# Patient Record
Sex: MALE | Race: WHITE
[De-identification: names, ages, dates, MRNs, and addresses within clinical notes are randomized per-mention and may not be internally consistent; named-entity substitution may affect disease eponyms.]

---

## 2018-02-10 ENCOUNTER — HOSPITAL ENCOUNTER (EMERGENCY)
Dept: HOSPITAL 47 - EC | Age: 83
Discharge: ANOTHER HOSPITAL | End: 2018-02-10
Payer: MEDICARE

## 2018-02-10 VITALS — TEMPERATURE: 97.3 F

## 2018-02-10 VITALS — HEART RATE: 76 BPM | DIASTOLIC BLOOD PRESSURE: 77 MMHG | SYSTOLIC BLOOD PRESSURE: 134 MMHG

## 2018-02-10 VITALS — RESPIRATION RATE: 18 BRPM

## 2018-02-10 DIAGNOSIS — R53.83: ICD-10-CM

## 2018-02-10 DIAGNOSIS — I10: ICD-10-CM

## 2018-02-10 DIAGNOSIS — I16.0: ICD-10-CM

## 2018-02-10 DIAGNOSIS — I63.9: Primary | ICD-10-CM

## 2018-02-10 DIAGNOSIS — Z87.891: ICD-10-CM

## 2018-02-10 DIAGNOSIS — Z79.899: ICD-10-CM

## 2018-02-10 DIAGNOSIS — G81.94: ICD-10-CM

## 2018-02-10 LAB
ALBUMIN SERPL-MCNC: 4 G/DL (ref 3.5–5)
ALP SERPL-CCNC: 98 U/L (ref 38–126)
ALT SERPL-CCNC: 36 U/L (ref 21–72)
ANION GAP SERPL CALC-SCNC: 9 MMOL/L
APTT BLD: 26.8 SEC (ref 22–30)
AST SERPL-CCNC: 31 U/L (ref 17–59)
BASOPHILS # BLD AUTO: 0 K/UL (ref 0–0.2)
BASOPHILS NFR BLD AUTO: 1 %
BUN SERPL-SCNC: 15 MG/DL (ref 9–20)
CALCIUM SPEC-MCNC: 9.5 MG/DL (ref 8.4–10.2)
CHLORIDE SERPL-SCNC: 104 MMOL/L (ref 98–107)
CK SERPL-CCNC: 119 U/L (ref 55–170)
CO2 SERPL-SCNC: 27 MMOL/L (ref 22–30)
EOSINOPHIL # BLD AUTO: 0.1 K/UL (ref 0–0.7)
EOSINOPHIL NFR BLD AUTO: 2 %
ERYTHROCYTE [DISTWIDTH] IN BLOOD BY AUTOMATED COUNT: 4.2 M/UL (ref 4.3–5.9)
ERYTHROCYTE [DISTWIDTH] IN BLOOD: 14.3 % (ref 11.5–15.5)
GLUCOSE BLD-MCNC: 66 MG/DL (ref 75–99)
GLUCOSE SERPL-MCNC: 96 MG/DL (ref 74–99)
HCT VFR BLD AUTO: 44.1 % (ref 39–53)
HGB BLD-MCNC: 14.5 GM/DL (ref 13–17.5)
INR PPP: 1.1 (ref ?–1.2)
LYMPHOCYTES # SPEC AUTO: 1.5 K/UL (ref 1–4.8)
LYMPHOCYTES NFR SPEC AUTO: 30 %
MCH RBC QN AUTO: 34.6 PG (ref 25–35)
MCHC RBC AUTO-ENTMCNC: 32.9 G/DL (ref 31–37)
MCV RBC AUTO: 105.1 FL (ref 80–100)
MONOCYTES # BLD AUTO: 0.3 K/UL (ref 0–1)
MONOCYTES NFR BLD AUTO: 6 %
NEUTROPHILS # BLD AUTO: 3 K/UL (ref 1.3–7.7)
NEUTROPHILS NFR BLD AUTO: 59 %
PLATELET # BLD AUTO: 153 K/UL (ref 150–450)
POTASSIUM SERPL-SCNC: 3.9 MMOL/L (ref 3.5–5.1)
PROT SERPL-MCNC: 6.7 G/DL (ref 6.3–8.2)
PT BLD: 10.5 SEC (ref 9–12)
SODIUM SERPL-SCNC: 140 MMOL/L (ref 137–145)
TROPONIN I SERPL-MCNC: <0.012 NG/ML (ref 0–0.03)
WBC # BLD AUTO: 5.1 K/UL (ref 3.8–10.6)

## 2018-02-10 PROCEDURE — 82553 CREATINE MB FRACTION: CPT

## 2018-02-10 PROCEDURE — 71045 X-RAY EXAM CHEST 1 VIEW: CPT

## 2018-02-10 PROCEDURE — 85610 PROTHROMBIN TIME: CPT

## 2018-02-10 PROCEDURE — 96375 TX/PRO/DX INJ NEW DRUG ADDON: CPT

## 2018-02-10 PROCEDURE — 99291 CRITICAL CARE FIRST HOUR: CPT

## 2018-02-10 PROCEDURE — 80053 COMPREHEN METABOLIC PANEL: CPT

## 2018-02-10 PROCEDURE — 84484 ASSAY OF TROPONIN QUANT: CPT

## 2018-02-10 PROCEDURE — 96365 THER/PROPH/DIAG IV INF INIT: CPT

## 2018-02-10 PROCEDURE — 93005 ELECTROCARDIOGRAM TRACING: CPT

## 2018-02-10 PROCEDURE — 85730 THROMBOPLASTIN TIME PARTIAL: CPT

## 2018-02-10 PROCEDURE — 85025 COMPLETE CBC W/AUTO DIFF WBC: CPT

## 2018-02-10 PROCEDURE — 70450 CT HEAD/BRAIN W/O DYE: CPT

## 2018-02-10 PROCEDURE — 36415 COLL VENOUS BLD VENIPUNCTURE: CPT

## 2018-02-10 PROCEDURE — 82550 ASSAY OF CK (CPK): CPT

## 2018-02-10 NOTE — ED
Medical Decision Making





- Lab Data


Result diagrams: 


 02/10/18 13:56





 02/10/18 13:56





 Lab Results











  02/10/18 02/10/18 02/10/18 Range/Units





  13:45 13:56 13:56 


 


WBC   5.1   (3.8-10.6)  k/uL


 


RBC   4.20 L   (4.30-5.90)  m/uL


 


Hgb   14.5   (13.0-17.5)  gm/dL


 


Hct   44.1   (39.0-53.0)  %


 


MCV   105.1 H   (80.0-100.0)  fL


 


MCH   34.6   (25.0-35.0)  pg


 


MCHC   32.9   (31.0-37.0)  g/dL


 


RDW   14.3   (11.5-15.5)  %


 


Plt Count   153   (150-450)  k/uL


 


Neutrophils %   59   %


 


Lymphocytes %   30   %


 


Monocytes %   6   %


 


Eosinophils %   2   %


 


Basophils %   1   %


 


Neutrophils #   3.0   (1.3-7.7)  k/uL


 


Lymphocytes #   1.5   (1.0-4.8)  k/uL


 


Monocytes #   0.3   (0-1.0)  k/uL


 


Eosinophils #   0.1   (0-0.7)  k/uL


 


Basophils #   0.0   (0-0.2)  k/uL


 


Macrocytosis   Moderate   


 


PT     (9.0-12.0)  sec


 


INR     (<1.2)  


 


APTT     (22.0-30.0)  sec


 


Sodium    140  (137-145)  mmol/L


 


Potassium    3.9  (3.5-5.1)  mmol/L


 


Chloride    104  ()  mmol/L


 


Carbon Dioxide    27  (22-30)  mmol/L


 


Anion Gap    9  mmol/L


 


BUN    15  (9-20)  mg/dL


 


Creatinine    1.01  (0.66-1.25)  mg/dL


 


Est GFR (MDRD) Af Amer    >60  (>60 ml/min/1.73 sqM)  


 


Est GFR (MDRD) Non-Af    >60  (>60 ml/min/1.73 sqM)  


 


Glucose    96  (74-99)  mg/dL


 


POC Glucose (mg/dL)  66 L    (75-99)  mg/dL


 


POC Glu Operater ID  Loan Morrison    


 


Calcium    9.5  (8.4-10.2)  mg/dL


 


Total Bilirubin    1.1  (0.2-1.3)  mg/dL


 


AST    31  (17-59)  U/L


 


ALT    36  (21-72)  U/L


 


Alkaline Phosphatase    98  ()  U/L


 


Total Protein    6.7  (6.3-8.2)  g/dL


 


Albumin    4.0  (3.5-5.0)  g/dL














  02/10/18 Range/Units





  13:56 


 


WBC   (3.8-10.6)  k/uL


 


RBC   (4.30-5.90)  m/uL


 


Hgb   (13.0-17.5)  gm/dL


 


Hct   (39.0-53.0)  %


 


MCV   (80.0-100.0)  fL


 


MCH   (25.0-35.0)  pg


 


MCHC   (31.0-37.0)  g/dL


 


RDW   (11.5-15.5)  %


 


Plt Count   (150-450)  k/uL


 


Neutrophils %   %


 


Lymphocytes %   %


 


Monocytes %   %


 


Eosinophils %   %


 


Basophils %   %


 


Neutrophils #   (1.3-7.7)  k/uL


 


Lymphocytes #   (1.0-4.8)  k/uL


 


Monocytes #   (0-1.0)  k/uL


 


Eosinophils #   (0-0.7)  k/uL


 


Basophils #   (0-0.2)  k/uL


 


Macrocytosis   


 


PT  10.5  (9.0-12.0)  sec


 


INR  1.1  (<1.2)  


 


APTT  26.8  (22.0-30.0)  sec


 


Sodium   (137-145)  mmol/L


 


Potassium   (3.5-5.1)  mmol/L


 


Chloride   ()  mmol/L


 


Carbon Dioxide   (22-30)  mmol/L


 


Anion Gap   mmol/L


 


BUN   (9-20)  mg/dL


 


Creatinine   (0.66-1.25)  mg/dL


 


Est GFR (MDRD) Af Amer   (>60 ml/min/1.73 sqM)  


 


Est GFR (MDRD) Non-Af   (>60 ml/min/1.73 sqM)  


 


Glucose   (74-99)  mg/dL


 


POC Glucose (mg/dL)   (75-99)  mg/dL


 


POC Glu Operater ID   


 


Calcium   (8.4-10.2)  mg/dL


 


Total Bilirubin   (0.2-1.3)  mg/dL


 


AST   (17-59)  U/L


 


ALT   (21-72)  U/L


 


Alkaline Phosphatase   ()  U/L


 


Total Protein   (6.3-8.2)  g/dL


 


Albumin   (3.5-5.0)  g/dL














Disposition


Clinical Impression: 


 Cerebrovascular accident, Hypertensive emergency





Disposition: OTHER INSTITUTION NOT DEFINED


Condition: Serious


Referrals: 


Joel Mcadams MD [Primary Care Provider] - 1-2 days





- Out of Hospital Transfer - Req. Specs


Out of Hospital Transfer - Requested Specifics: Neurological ICU

## 2018-02-10 NOTE — ED
Neuro HPI





- General


Stated Complaint: CVA


Time Seen by Provider: 02/10/18 13:32


Source: EMS, RN notes reviewed


Mode of arrival: EMS





- History of Present Illness


Is the patient presenting with stroke symptoms?: Yes


Initial Comments: 





This is a 84-year-old male history of hypertension was last seen around 11:30 

today he was found on the floor by his wife and his bedroom.  EMS was called he 

was found have a right lateral gaze left hemiparesis.  He was nonverbal and did 

not respond to verbal commands he does respond to painful stimulus.  Injury 

reported.





- Related Data


Home Medications: 


 Home Medications











 Medication  Instructions  Recorded  Confirmed


 


DULoxetine HCL [Cymbalta] 30 mg PO DAILY 02/10/18 02/10/18


 


Methotrexate Sodium [Methotrexate] 12.5 mg PO DUFF 02/10/18 02/10/18


 


Methylphenidate HCl 10 mg PO BID 02/10/18 02/10/18


 


Triamterene-Hctz 37.5-25Mg 1 cap PO WE 02/10/18 02/10/18





[Dyazide 37.5-25 Capsule]   











Allergies/Adverse Reactions: 


 Allergies











Allergy/AdvReac Type Severity Reaction Status Date / Time


 


No Known Allergies Allergy   Verified 02/10/18 14:04














Review of Systems


ROS Statement: 


Those systems with pertinent positive or pertinent negative responses have been 

documented in the HPI.





ROS Other: All systems not noted in ROS Statement are negative.





General Exam





- General Exam Comments


Initial Comments: 





This is a well-developed well-nourished awake lethargic male


Limitations: altered mental status, physical limitation


General appearance: lethargic, other


Head exam: Present: atraumatic, normocephalic, normal inspection


Eye exam: Present: other (Right lateral gaze pupils are equal round and 

reactive.)


ENT exam: Present: mucous membranes dry


Neck exam: Present: normal inspection.  Absent: tenderness, meningismus, 

lymphadenopathy


Respiratory exam: Present: normal lung sounds bilaterally.  Absent: respiratory 

distress, wheezes, rales, rhonchi, stridor


Cardiovascular Exam: Present: regular rate, normal rhythm, normal heart sounds.

  Absent: systolic murmur, diastolic murmur, rubs, gallop, clicks


GI/Abdominal exam: Present: soft, normal bowel sounds.  Absent: distended, 

tenderness, guarding, rebound, rigid


Rectal exam: Present: deferred


Extremities exam: Present: normal inspection, normal capillary refill, other (

Evidence of left jaclyn-plegia and left hemiparesis of lower extremity).  Absent: 

full ROM


Back exam: Present: normal inspection


Neurological exam: Present: altered


Psychiatric exam: Present: other (Unable to evaluate)


Skin exam: Present: warm, dry, intact, normal color





Stroke MDM





- Lab Data


Result diagrams: 


 02/10/18 13:56





 02/10/18 13:56


 Lab Results











  02/10/18 02/10/18 02/10/18 Range/Units





  13:45 13:56 13:56 


 


WBC   5.1   (3.8-10.6)  k/uL


 


RBC   4.20 L   (4.30-5.90)  m/uL


 


Hgb   14.5   (13.0-17.5)  gm/dL


 


Hct   44.1   (39.0-53.0)  %


 


MCV   105.1 H   (80.0-100.0)  fL


 


MCH   34.6   (25.0-35.0)  pg


 


MCHC   32.9   (31.0-37.0)  g/dL


 


RDW   14.3   (11.5-15.5)  %


 


Plt Count   153   (150-450)  k/uL


 


Neutrophils %   59   %


 


Lymphocytes %   30   %


 


Monocytes %   6   %


 


Eosinophils %   2   %


 


Basophils %   1   %


 


Neutrophils #   3.0   (1.3-7.7)  k/uL


 


Lymphocytes #   1.5   (1.0-4.8)  k/uL


 


Monocytes #   0.3   (0-1.0)  k/uL


 


Eosinophils #   0.1   (0-0.7)  k/uL


 


Basophils #   0.0   (0-0.2)  k/uL


 


Macrocytosis   Moderate   


 


PT     (9.0-12.0)  sec


 


INR     (<1.2)  


 


APTT     (22.0-30.0)  sec


 


Sodium    140  (137-145)  mmol/L


 


Potassium    3.9  (3.5-5.1)  mmol/L


 


Chloride    104  ()  mmol/L


 


Carbon Dioxide    27  (22-30)  mmol/L


 


Anion Gap    9  mmol/L


 


BUN    15  (9-20)  mg/dL


 


Creatinine    1.01  (0.66-1.25)  mg/dL


 


Est GFR (MDRD) Af Amer    >60  (>60 ml/min/1.73 sqM)  


 


Est GFR (MDRD) Non-Af    >60  (>60 ml/min/1.73 sqM)  


 


Glucose    96  (74-99)  mg/dL


 


POC Glucose (mg/dL)  66 L    (75-99)  mg/dL


 


POC Glu Operater ID  Loan Morrison    


 


Calcium    9.5  (8.4-10.2)  mg/dL


 


Total Bilirubin    1.1  (0.2-1.3)  mg/dL


 


AST    31  (17-59)  U/L


 


ALT    36  (21-72)  U/L


 


Alkaline Phosphatase    98  ()  U/L


 


Total Protein    6.7  (6.3-8.2)  g/dL


 


Albumin    4.0  (3.5-5.0)  g/dL














  02/10/18 Range/Units





  13:56 


 


WBC   (3.8-10.6)  k/uL


 


RBC   (4.30-5.90)  m/uL


 


Hgb   (13.0-17.5)  gm/dL


 


Hct   (39.0-53.0)  %


 


MCV   (80.0-100.0)  fL


 


MCH   (25.0-35.0)  pg


 


MCHC   (31.0-37.0)  g/dL


 


RDW   (11.5-15.5)  %


 


Plt Count   (150-450)  k/uL


 


Neutrophils %   %


 


Lymphocytes %   %


 


Monocytes %   %


 


Eosinophils %   %


 


Basophils %   %


 


Neutrophils #   (1.3-7.7)  k/uL


 


Lymphocytes #   (1.0-4.8)  k/uL


 


Monocytes #   (0-1.0)  k/uL


 


Eosinophils #   (0-0.7)  k/uL


 


Basophils #   (0-0.2)  k/uL


 


Macrocytosis   


 


PT  10.5  (9.0-12.0)  sec


 


INR  1.1  (<1.2)  


 


APTT  26.8  (22.0-30.0)  sec


 


Sodium   (137-145)  mmol/L


 


Potassium   (3.5-5.1)  mmol/L


 


Chloride   ()  mmol/L


 


Carbon Dioxide   (22-30)  mmol/L


 


Anion Gap   mmol/L


 


BUN   (9-20)  mg/dL


 


Creatinine   (0.66-1.25)  mg/dL


 


Est GFR (MDRD) Af Amer   (>60 ml/min/1.73 sqM)  


 


Est GFR (MDRD) Non-Af   (>60 ml/min/1.73 sqM)  


 


Glucose   (74-99)  mg/dL


 


POC Glucose (mg/dL)   (75-99)  mg/dL


 


POC Glu Operater ID   


 


Calcium   (8.4-10.2)  mg/dL


 


Total Bilirubin   (0.2-1.3)  mg/dL


 


AST   (17-59)  U/L


 


ALT   (21-72)  U/L


 


Alkaline Phosphatase   ()  U/L


 


Total Protein   (6.3-8.2)  g/dL


 


Albumin   (3.5-5.0)  g/dL














- NIH Stroke Scale


1a. Level of Consciousness: (1) not alert, arousable


1b. LOC Questions: (2) answers no questions correctly


1c. LOC Commands: (2) performs no tasks correctly


2. Best Gaze: (2) forced deviation


3. Visual: (0) no visual loss


4. Facial Palsy: (1) minor paralysis


5a. Motor Arm Left: (3) no gravity effort


5b. Motor Arm Right: (1) drift


6a. Motor Leg Left: (2) some gravity effort


6b. Motor Leg Right: (1) drift


7. Limb Ataxia: (0) absent


8. Sensory: (0) normal


9. Best Language: (un) mute/global aphasia


10. Dysarthria: (0) normal


11. Extinction/Inattention: (2) profound inattention





- Thrombolytic Inclusion/Exclusion


Thrombolytic Contraindications: Hx of ICH/AVM/Aneurysms





- Radiology Data


Radiology results: report reviewed (Patient does have an intraparenchymal bleed 

on the right hemisphere.  I did discuss the case with Dr. Fang), image reviewed





- EKG Data


-: EKG Interpreted by Me


EKG shows normal: sinus rhythm (Sinus rhythm 64.  Interval 202 QRS 88 daily 

since QTC of 400/412 artifact is present)





Past Medical History


Past Medical History: Diabetes Mellitus, Hypertension


Additional Past Medical History / Comment(s): PER PTS WIFE-PT WAS DX APPROX A 

MONTH AGO WITH DM.


History of Any Multi-Drug Resistant Organisms: None Reported


Past Surgical History: Tonsillectomy


Past Anesthesia/Blood Transfusion Reactions: No Reported Reaction


Past Psychological History: No Psychological Hx Reported


Smoking Status: Former smoker


Past Alcohol Use History: None Reported


Past Drug Use History: None Reported





- Past Family History


  ** Father


Family Medical History: Dementia





  ** Mother


Family Medical History: Cancer


Additional Family Medical History / Comment(s): OVARIAN CANCER





  ** Sister(s)


Family Medical History: Cancer


Additional Family Medical History / Comment(s): ESOPHAGEAL CANER





Course


 Vital Signs











  02/10/18 02/10/18 02/10/18





  13:35 13:50 14:05


 


Temperature 97.3 F L  


 


Pulse Rate 70 70 62


 


Respiratory 18 18 16





Rate   


 


Blood Pressure 207/99 180/92 174/90


 


O2 Sat by Pulse 95 97 99





Oximetry   














- Reevaluation(s)


Reevaluation #1: 





02/10/18 14:38


Patient did have slight movement of his left upper extremity.





Critical Care Time


Critical Care Time: Yes


Critical Care Time: 





45 minutes of critical care time which includes monitoring initially EMS run 

and discussed with paramedics history physical labs x-rays discussion with the 

interventional is discussion with radiology.  Discussion with family members.  

Several re-evaluations the patient.  Discussion with the emergency department 

at [facility].





Disposition


Clinical Impression: 


 Cerebrovascular accident





Disposition: OTHER INSTITUTION NOT DEFINED


Condition: Serious


Referrals: 


Joel Mcadams MD [Primary Care Provider] - 1-2 days





- Out of Hospital Transfer - Req. Specs


Out of Hospital Transfer - Requested Specifics: Neurological ICU

## 2018-02-10 NOTE — CT
EXAMINATION TYPE: CT brain wo con for TPA

 

DATE OF EXAM: 2/10/2018

 

COMPARISON: Previous study dated 8/23/2016.

 

HISTORY: Lt side weakness

 

CT DLP: 1064.3 mGycm

Automated exposure control for dose reduction was used.

 

FINDINGS: 

There is a 4.3 x 4.8 x 3 cm intraparenchymal hemorrhage in the right frontal lobe. This is effacing t
he frontal horn of the right lateral ventricle. There is no significant subfalcine shift.

 

There are generalized changes of sulcal prominence and ventriculomegaly, compatible with atrophic viv
nge. There is diffuse periventricular white matter lucency, compatible with small vessel ischemic viv
nge. Visualized portions of the paranasal sinuses are clear. There is fluid in the right-sided mastoi
d air cells.

 

IMPRESSION: 

1. LARGE INTRAPARENCHYMAL BLEED ON THE RIGHT.

2. ATROPHIC CHANGE.

3. CHRONIC WHITE MATTER ISCHEMIC CHANGE.

4. FINDINGS CONSISTENT WITH RIGHT-SIDED MASTOIDITIS.

## 2020-01-01 NOTE — XR
EXAMINATION TYPE: XR chest 1V portable

 

DATE OF EXAM: 2/10/2018

 

COMPARISON: August 23, 2016

 

HISTORY: Shortness of breath

 

TECHNIQUE: Single frontal view of the chest is obtained.

 

FINDINGS:  There is no focal air space opacity, pleural effusion, or pneumothorax seen.  The cardiac 
silhouette size is within normal limits.   The osseous structures are intact. There could be mild cep
halization of the pulmonary vessels.

 

IMPRESSION:  There could be mild CHF. adherent prepuce,